# Patient Record
Sex: FEMALE | Race: WHITE | URBAN - METROPOLITAN AREA
[De-identification: names, ages, dates, MRNs, and addresses within clinical notes are randomized per-mention and may not be internally consistent; named-entity substitution may affect disease eponyms.]

---

## 2017-09-24 ENCOUNTER — INPATIENT (INPATIENT)
Facility: HOSPITAL | Age: 82
LOS: 2 days | Discharge: HOME HEALTH | End: 2017-09-27
Attending: INTERNAL MEDICINE | Admitting: INTERNAL MEDICINE
Payer: MEDICARE

## 2017-09-24 VITALS
HEART RATE: 73 BPM | WEIGHT: 89.95 LBS | DIASTOLIC BLOOD PRESSURE: 76 MMHG | RESPIRATION RATE: 18 BRPM | HEIGHT: 60 IN | SYSTOLIC BLOOD PRESSURE: 135 MMHG | TEMPERATURE: 99 F | OXYGEN SATURATION: 86 %

## 2017-09-24 DIAGNOSIS — R74.8 ABNORMAL LEVELS OF OTHER SERUM ENZYMES: ICD-10-CM

## 2017-09-24 DIAGNOSIS — F03.90 UNSPECIFIED DEMENTIA, UNSPECIFIED SEVERITY, WITHOUT BEHAVIORAL DISTURBANCE, PSYCHOTIC DISTURBANCE, MOOD DISTURBANCE, AND ANXIETY: ICD-10-CM

## 2017-09-24 DIAGNOSIS — J18.9 PNEUMONIA, UNSPECIFIED ORGANISM: ICD-10-CM

## 2017-09-24 DIAGNOSIS — E78.5 HYPERLIPIDEMIA, UNSPECIFIED: ICD-10-CM

## 2017-09-24 DIAGNOSIS — D64.9 ANEMIA, UNSPECIFIED: ICD-10-CM

## 2017-09-24 DIAGNOSIS — I25.10 ATHEROSCLEROTIC HEART DISEASE OF NATIVE CORONARY ARTERY WITHOUT ANGINA PECTORIS: ICD-10-CM

## 2017-09-24 LAB
ALBUMIN SERPL ELPH-MCNC: 2.5 G/DL — LOW (ref 3.3–5)
ALP SERPL-CCNC: 101 U/L — SIGNIFICANT CHANGE UP (ref 40–120)
ALT FLD-CCNC: 22 U/L — SIGNIFICANT CHANGE UP (ref 12–78)
ANION GAP SERPL CALC-SCNC: 4 MMOL/L — LOW (ref 5–17)
ANISOCYTOSIS BLD QL: SLIGHT — SIGNIFICANT CHANGE UP
APPEARANCE UR: (no result)
APTT BLD: 28.2 SEC — SIGNIFICANT CHANGE UP (ref 27.5–37.4)
AST SERPL-CCNC: 29 U/L — SIGNIFICANT CHANGE UP (ref 15–37)
BACTERIA # UR AUTO: (no result)
BASO STIPL BLD QL SMEAR: SLIGHT — SIGNIFICANT CHANGE UP
BASOPHILS # BLD AUTO: 0.1 K/UL — SIGNIFICANT CHANGE UP (ref 0–0.2)
BASOPHILS NFR BLD AUTO: 0.9 % — SIGNIFICANT CHANGE UP (ref 0–2)
BILIRUB SERPL-MCNC: 0.4 MG/DL — SIGNIFICANT CHANGE UP (ref 0.2–1.2)
BILIRUB UR-MCNC: NEGATIVE — SIGNIFICANT CHANGE UP
BUN SERPL-MCNC: 14 MG/DL — SIGNIFICANT CHANGE UP (ref 7–23)
CALCIUM SERPL-MCNC: 8.6 MG/DL — SIGNIFICANT CHANGE UP (ref 8.5–10.1)
CHLORIDE SERPL-SCNC: 110 MMOL/L — HIGH (ref 96–108)
CHOLEST SERPL-MCNC: 112 MG/DL — SIGNIFICANT CHANGE UP (ref 10–199)
CK SERPL-CCNC: 178 U/L — SIGNIFICANT CHANGE UP (ref 26–192)
CO2 SERPL-SCNC: 30 MMOL/L — SIGNIFICANT CHANGE UP (ref 22–31)
COLOR SPEC: YELLOW — SIGNIFICANT CHANGE UP
COMMENT - URINE: SIGNIFICANT CHANGE UP
CREAT SERPL-MCNC: 0.66 MG/DL — SIGNIFICANT CHANGE UP (ref 0.5–1.3)
DIFF PNL FLD: (no result)
EOSINOPHIL # BLD AUTO: 0.1 K/UL — SIGNIFICANT CHANGE UP (ref 0–0.5)
EOSINOPHIL NFR BLD AUTO: 0.9 % — SIGNIFICANT CHANGE UP (ref 0–6)
EPI CELLS # UR: NEGATIVE — SIGNIFICANT CHANGE UP
GLUCOSE SERPL-MCNC: 111 MG/DL — HIGH (ref 70–99)
GLUCOSE UR QL: NEGATIVE MG/DL — SIGNIFICANT CHANGE UP
HCT VFR BLD CALC: 38.3 % — SIGNIFICANT CHANGE UP (ref 34.5–45)
HDLC SERPL-MCNC: 68 MG/DL — SIGNIFICANT CHANGE UP (ref 40–125)
HGB BLD-MCNC: 12.4 G/DL — SIGNIFICANT CHANGE UP (ref 11.5–15.5)
HYPERCHROMIA BLD QL AUTO: SLIGHT — SIGNIFICANT CHANGE UP
INR BLD: 1.24 RATIO — HIGH (ref 0.88–1.16)
KETONES UR-MCNC: NEGATIVE — SIGNIFICANT CHANGE UP
LACTATE SERPL-SCNC: 1.2 MMOL/L — SIGNIFICANT CHANGE UP (ref 0.7–2)
LEUKOCYTE ESTERASE UR-ACNC: (no result)
LIPID PNL WITH DIRECT LDL SERPL: 35 MG/DL — SIGNIFICANT CHANGE UP
LYMPHOCYTES # BLD AUTO: 1.5 K/UL — SIGNIFICANT CHANGE UP (ref 1–3.3)
LYMPHOCYTES # BLD AUTO: 18.2 % — SIGNIFICANT CHANGE UP (ref 13–44)
MACROCYTES BLD QL: SIGNIFICANT CHANGE UP
MAGNESIUM SERPL-MCNC: 1.7 MG/DL — SIGNIFICANT CHANGE UP (ref 1.6–2.6)
MANUAL DIF COMMENT BLD-IMP: SIGNIFICANT CHANGE UP
MCHC RBC-ENTMCNC: 32.3 GM/DL — SIGNIFICANT CHANGE UP (ref 32–36)
MCHC RBC-ENTMCNC: 35.1 PG — HIGH (ref 27–34)
MCV RBC AUTO: 110.8 FL — HIGH (ref 80–100)
MONOCYTES # BLD AUTO: 0.4 K/UL — SIGNIFICANT CHANGE UP (ref 0–0.9)
MONOCYTES NFR BLD AUTO: 4.4 % — SIGNIFICANT CHANGE UP (ref 2–14)
NEUTROPHILS # BLD AUTO: 6.3 K/UL — SIGNIFICANT CHANGE UP (ref 1.8–7.4)
NEUTROPHILS NFR BLD AUTO: 75.5 % — SIGNIFICANT CHANGE UP (ref 43–77)
NITRITE UR-MCNC: POSITIVE
NT-PROBNP SERPL-SCNC: HIGH PG/ML (ref 0–450)
OVALOCYTES BLD QL SMEAR: SLIGHT — SIGNIFICANT CHANGE UP
PH UR: 5 — SIGNIFICANT CHANGE UP (ref 5–8)
PLAT MORPH BLD: NORMAL — SIGNIFICANT CHANGE UP
PLATELET # BLD AUTO: 130 K/UL — LOW (ref 150–400)
POIKILOCYTOSIS BLD QL AUTO: SLIGHT — SIGNIFICANT CHANGE UP
POLYCHROMASIA BLD QL SMEAR: SLIGHT — SIGNIFICANT CHANGE UP
POTASSIUM SERPL-MCNC: 4 MMOL/L — SIGNIFICANT CHANGE UP (ref 3.5–5.3)
POTASSIUM SERPL-SCNC: 4 MMOL/L — SIGNIFICANT CHANGE UP (ref 3.5–5.3)
PROT SERPL-MCNC: 6.5 GM/DL — SIGNIFICANT CHANGE UP (ref 6–8.3)
PROT UR-MCNC: 30 MG/DL
PROTHROM AB SERPL-ACNC: 13.4 SEC — HIGH (ref 9.8–12.7)
RAPID RVP RESULT: DETECTED
RBC # BLD: 3.46 M/UL — LOW (ref 3.8–5.2)
RBC # FLD: 13.7 % — SIGNIFICANT CHANGE UP (ref 10.3–14.5)
RBC BLD AUTO: (no result)
RBC CASTS # UR COMP ASSIST: SIGNIFICANT CHANGE UP /HPF (ref 0–4)
RV+EV RNA SPEC QL NAA+PROBE: DETECTED
SODIUM SERPL-SCNC: 144 MMOL/L — SIGNIFICANT CHANGE UP (ref 135–145)
SP GR SPEC: 1.02 — SIGNIFICANT CHANGE UP (ref 1.01–1.02)
TOTAL CHOLESTEROL/HDL RATIO MEASUREMENT: 1.6 RATIO — LOW (ref 3.3–7.1)
TRIGL SERPL-MCNC: 47 MG/DL — SIGNIFICANT CHANGE UP (ref 10–149)
TROPONIN I SERPL-MCNC: 0.07 NG/ML — HIGH (ref 0.01–0.04)
TROPONIN I SERPL-MCNC: 0.1 NG/ML — HIGH (ref 0.01–0.04)
TSH SERPL-MCNC: 2.69 UIU/ML — SIGNIFICANT CHANGE UP (ref 0.36–3.74)
UROBILINOGEN FLD QL: NEGATIVE MG/DL — SIGNIFICANT CHANGE UP
WBC # BLD: 8.5 K/UL — SIGNIFICANT CHANGE UP (ref 3.8–10.5)
WBC # FLD AUTO: 8.5 K/UL — SIGNIFICANT CHANGE UP (ref 3.8–10.5)
WBC UR QL: (no result)

## 2017-09-24 PROCEDURE — 99285 EMERGENCY DEPT VISIT HI MDM: CPT

## 2017-09-24 PROCEDURE — 71250 CT THORAX DX C-: CPT | Mod: 26

## 2017-09-24 PROCEDURE — 71010: CPT | Mod: 26

## 2017-09-24 PROCEDURE — 93010 ELECTROCARDIOGRAM REPORT: CPT

## 2017-09-24 RX ORDER — MEMANTINE HYDROCHLORIDE AND DONEPEZIL HYDROCHLORIDE 21; 10 MG/1; MG/1
1 CAPSULE ORAL
Qty: 0 | Refills: 0 | COMMUNITY

## 2017-09-24 RX ORDER — HEPARIN SODIUM 5000 [USP'U]/ML
5000 INJECTION INTRAVENOUS; SUBCUTANEOUS EVERY 12 HOURS
Qty: 0 | Refills: 0 | Status: DISCONTINUED | OUTPATIENT
Start: 2017-09-24 | End: 2017-09-27

## 2017-09-24 RX ORDER — ATORVASTATIN CALCIUM 80 MG/1
1 TABLET, FILM COATED ORAL
Qty: 0 | Refills: 0 | COMMUNITY

## 2017-09-24 RX ORDER — IPRATROPIUM/ALBUTEROL SULFATE 18-103MCG
3 AEROSOL WITH ADAPTER (GRAM) INHALATION ONCE
Qty: 0 | Refills: 0 | Status: COMPLETED | OUTPATIENT
Start: 2017-09-24 | End: 2017-09-24

## 2017-09-24 RX ORDER — MEMANTINE HYDROCHLORIDE 10 MG/1
10 TABLET ORAL DAILY
Qty: 0 | Refills: 0 | Status: DISCONTINUED | OUTPATIENT
Start: 2017-09-24 | End: 2017-09-27

## 2017-09-24 RX ORDER — ATORVASTATIN CALCIUM 80 MG/1
40 TABLET, FILM COATED ORAL AT BEDTIME
Qty: 0 | Refills: 0 | Status: DISCONTINUED | OUTPATIENT
Start: 2017-09-24 | End: 2017-09-27

## 2017-09-24 RX ORDER — ONDANSETRON 8 MG/1
4 TABLET, FILM COATED ORAL EVERY 4 HOURS
Qty: 0 | Refills: 0 | Status: DISCONTINUED | OUTPATIENT
Start: 2017-09-24 | End: 2017-09-27

## 2017-09-24 RX ORDER — POTASSIUM CHLORIDE 20 MEQ
1 PACKET (EA) ORAL
Qty: 0 | Refills: 0 | COMMUNITY

## 2017-09-24 RX ORDER — WHEAT DEXTRIN 3 G/4 G
1 POWDER IN PACKET (EA) ORAL
Qty: 0 | Refills: 0 | COMMUNITY

## 2017-09-24 RX ORDER — ASPIRIN/CALCIUM CARB/MAGNESIUM 324 MG
325 TABLET ORAL ONCE
Qty: 0 | Refills: 0 | Status: COMPLETED | OUTPATIENT
Start: 2017-09-24 | End: 2017-09-24

## 2017-09-24 RX ORDER — BENZOYL PEROXIDE MICRONIZED 5.8 %
1 TOWELETTE (EA) TOPICAL
Qty: 0 | Refills: 0 | COMMUNITY

## 2017-09-24 RX ORDER — ACETAMINOPHEN 500 MG
650 TABLET ORAL EVERY 6 HOURS
Qty: 0 | Refills: 0 | Status: DISCONTINUED | OUTPATIENT
Start: 2017-09-24 | End: 2017-09-27

## 2017-09-24 RX ORDER — CHOLECALCIFEROL (VITAMIN D3) 125 MCG
1 CAPSULE ORAL
Qty: 0 | Refills: 0 | COMMUNITY

## 2017-09-24 RX ORDER — MIRTAZAPINE 45 MG/1
0.5 TABLET, ORALLY DISINTEGRATING ORAL
Qty: 0 | Refills: 0 | COMMUNITY

## 2017-09-24 RX ORDER — ALBUTEROL 90 UG/1
2.5 AEROSOL, METERED ORAL EVERY 4 HOURS
Qty: 0 | Refills: 0 | Status: DISCONTINUED | OUTPATIENT
Start: 2017-09-24 | End: 2017-09-27

## 2017-09-24 RX ORDER — MIRTAZAPINE 45 MG/1
7.5 TABLET, ORALLY DISINTEGRATING ORAL AT BEDTIME
Qty: 0 | Refills: 0 | Status: DISCONTINUED | OUTPATIENT
Start: 2017-09-24 | End: 2017-09-27

## 2017-09-24 RX ORDER — ALBUTEROL 90 UG/1
1 AEROSOL, METERED ORAL EVERY 4 HOURS
Qty: 0 | Refills: 0 | Status: DISCONTINUED | OUTPATIENT
Start: 2017-09-24 | End: 2017-09-27

## 2017-09-24 RX ORDER — FUROSEMIDE 40 MG
1 TABLET ORAL
Qty: 0 | Refills: 0 | COMMUNITY

## 2017-09-24 RX ORDER — ASPIRIN/CALCIUM CARB/MAGNESIUM 324 MG
81 TABLET ORAL DAILY
Qty: 0 | Refills: 0 | Status: DISCONTINUED | OUTPATIENT
Start: 2017-09-24 | End: 2017-09-27

## 2017-09-24 RX ORDER — MIRTAZAPINE 45 MG/1
30 TABLET, ORALLY DISINTEGRATING ORAL AT BEDTIME
Qty: 0 | Refills: 0 | Status: DISCONTINUED | OUTPATIENT
Start: 2017-09-24 | End: 2017-09-27

## 2017-09-24 RX ORDER — DOCUSATE SODIUM 100 MG
100 CAPSULE ORAL
Qty: 0 | Refills: 0 | Status: DISCONTINUED | OUTPATIENT
Start: 2017-09-24 | End: 2017-09-27

## 2017-09-24 RX ORDER — CHOLECALCIFEROL (VITAMIN D3) 125 MCG
1000 CAPSULE ORAL DAILY
Qty: 0 | Refills: 0 | Status: DISCONTINUED | OUTPATIENT
Start: 2017-09-24 | End: 2017-09-27

## 2017-09-24 RX ORDER — ASPIRIN/CALCIUM CARB/MAGNESIUM 324 MG
1 TABLET ORAL
Qty: 0 | Refills: 0 | COMMUNITY

## 2017-09-24 RX ORDER — DONEPEZIL HYDROCHLORIDE 10 MG/1
10 TABLET, FILM COATED ORAL AT BEDTIME
Qty: 0 | Refills: 0 | Status: DISCONTINUED | OUTPATIENT
Start: 2017-09-24 | End: 2017-09-27

## 2017-09-24 RX ORDER — SODIUM CHLORIDE 9 MG/ML
1000 INJECTION INTRAMUSCULAR; INTRAVENOUS; SUBCUTANEOUS ONCE
Qty: 0 | Refills: 0 | Status: COMPLETED | OUTPATIENT
Start: 2017-09-24 | End: 2017-09-24

## 2017-09-24 RX ORDER — INFLUENZA VIRUS VACCINE 15; 15; 15; 15 UG/.5ML; UG/.5ML; UG/.5ML; UG/.5ML
0.5 SUSPENSION INTRAMUSCULAR ONCE
Qty: 0 | Refills: 0 | Status: COMPLETED | OUTPATIENT
Start: 2017-09-24 | End: 2017-09-24

## 2017-09-24 RX ORDER — LACTOBACILLUS ACIDOPHILUS 100MM CELL
1 CAPSULE ORAL
Qty: 0 | Refills: 0 | Status: DISCONTINUED | OUTPATIENT
Start: 2017-09-24 | End: 2017-09-27

## 2017-09-24 RX ORDER — MIRTAZAPINE 45 MG/1
1 TABLET, ORALLY DISINTEGRATING ORAL
Qty: 0 | Refills: 0 | COMMUNITY

## 2017-09-24 RX ADMIN — Medication 1000 UNIT(S): at 12:28

## 2017-09-24 RX ADMIN — MIRTAZAPINE 30 MILLIGRAM(S): 45 TABLET, ORALLY DISINTEGRATING ORAL at 22:16

## 2017-09-24 RX ADMIN — Medication 1 TABLET(S): at 16:13

## 2017-09-24 RX ADMIN — INFLUENZA VIRUS VACCINE 0.5 MILLILITER(S): 15; 15; 15; 15 SUSPENSION INTRAMUSCULAR at 12:28

## 2017-09-24 RX ADMIN — HEPARIN SODIUM 5000 UNIT(S): 5000 INJECTION INTRAVENOUS; SUBCUTANEOUS at 18:38

## 2017-09-24 RX ADMIN — Medication 325 MILLIGRAM(S): at 04:55

## 2017-09-24 RX ADMIN — SODIUM CHLORIDE 1000 MILLILITER(S): 9 INJECTION INTRAMUSCULAR; INTRAVENOUS; SUBCUTANEOUS at 02:54

## 2017-09-24 RX ADMIN — MIRTAZAPINE 7.5 MILLIGRAM(S): 45 TABLET, ORALLY DISINTEGRATING ORAL at 22:16

## 2017-09-24 RX ADMIN — ATORVASTATIN CALCIUM 40 MILLIGRAM(S): 80 TABLET, FILM COATED ORAL at 22:16

## 2017-09-24 RX ADMIN — Medication 1 TABLET(S): at 22:16

## 2017-09-24 RX ADMIN — MEMANTINE HYDROCHLORIDE 10 MILLIGRAM(S): 10 TABLET ORAL at 12:54

## 2017-09-24 RX ADMIN — DONEPEZIL HYDROCHLORIDE 10 MILLIGRAM(S): 10 TABLET, FILM COATED ORAL at 22:16

## 2017-09-24 RX ADMIN — Medication 1 TABLET(S): at 18:38

## 2017-09-24 RX ADMIN — HEPARIN SODIUM 5000 UNIT(S): 5000 INJECTION INTRAVENOUS; SUBCUTANEOUS at 06:23

## 2017-09-24 RX ADMIN — Medication 3 MILLILITER(S): at 02:54

## 2017-09-24 NOTE — CHART NOTE - NSCHARTNOTEFT_GEN_A_CORE
Hospitalist Update Note    Patient seen with family (daughter) at bedside. More awake, ate lunch, mild cough reported.     Vital Signs Last 24 Hrs  T(C): 36.8 (24 Sep 2017 10:37), Max: 37.4 (24 Sep 2017 01:17)  T(F): 98.2 (24 Sep 2017 10:37), Max: 99.3 (24 Sep 2017 01:17)  HR: 57 (24 Sep 2017 10:37) (57 - 76)  BP: 103/43 (24 Sep 2017 10:37) (103/43 - 140/89)  BP(mean): --  RR: 19 (24 Sep 2017 10:37) (18 - 19)  SpO2: 97% (24 Sep 2017 10:37) (86% - 98%)    Elderly frail female appears comfortable. Soft spoken.     A/P    # Pneumonia -  CAP + Enterovirus.   - family provides info that patient tested positive for Strep throat infection at Urgent care prior to admission thus likely the organism to cause PNA.   - s/p Levaquin 750mg --> switch to Amoxicillin 875mg BID.   - continue supportive care.     Case discussed w/ family and RN. Hospitalist Update Note    Patient seen with family (daughter) at bedside. More awake, ate lunch, mild cough reported.     Vital Signs Last 24 Hrs  T(C): 36.8 (24 Sep 2017 10:37), Max: 37.4 (24 Sep 2017 01:17)  T(F): 98.2 (24 Sep 2017 10:37), Max: 99.3 (24 Sep 2017 01:17)  HR: 57 (24 Sep 2017 10:37) (57 - 76)  BP: 103/43 (24 Sep 2017 10:37) (103/43 - 140/89)  BP(mean): --  RR: 19 (24 Sep 2017 10:37) (18 - 19)  SpO2: 97% (24 Sep 2017 10:37) (86% - 98%)    Elderly frail female appears comfortable. Soft spoken.     A/P    # Pneumonia -  CAP + Enterovirus.   - family provides info that patient tested positive for Strep throat infection at Urgent care prior to admission thus likely the organism to cause PNA.   - s/p Levaquin 750mg --> switch to Amoxicillin 875mg BID.   - add Probiotic.  - continue supportive care.     Case discussed w/ family and RN.

## 2017-09-24 NOTE — H&P ADULT - PROBLEM SELECTOR PLAN 4
Mood stable  Resume home meds remeron, namenda, and aricept Mood stable  Resume home meds remeron, namenda, and aricept  supportive care/fall precautions

## 2017-09-24 NOTE — H&P ADULT - HISTORY OF PRESENT ILLNESS
The patient is a 89 y/o Female with no prior significant PMHx.  She presents to the ED with reports of weakness. The pt's family provides that the pt was feeling weak since the past few days, and today was so weak she couldn't sit in her chair. The pt notes that she has been having sob and productive cough associated with sputum since the past 1 day.   Denies associated fevers, or chill.  No h/o Headache, dizziness, abd pain, N/V/D, cp, rash or urinary incontinence.    ED course:   troponin = .074, BNP = 16,330, chest xray with evidence for infiltrate  Pt was given levofloxacin 750mg, duoneb, and ASA 325mg The patient is a 89 y/o Female with a PMHx notable for dementia, anemia, and hyperlipidemia.   She presents to the ED with reports of weakness. The pt's family provides that the pt was feeling weak since the past few days, and today was so weak she couldn't sit in her chair. The pt notes that she has been having sob and productive cough associated with sputum since the past 1 day.   Denies associated fevers, or chill.  No h/o Headache, dizziness, abd pain, N/V/D, cp, rash or urinary incontinence.    ED course:   troponin = .074, BNP = 16,330, chest xray with evidence for infiltrate  Pt was given levofloxacin 750mg, duoneb, and ASA 325mg The patient is a 89 y/o Female with a PMHx notable for dementia, anemia, and hyperlipidemia.   She presents to the ED with reports of weakness. The pt's family provides that the pt was feeling weak since the past few days, and today was so weak she couldn't sit in her chair. The pt notes that she has been having sob and productive cough associated with sputum since the past 1 day.   Denies associated fevers, or chill.  No h/o Headache, dizziness, abd pain, N/V/D, cp, rash or urinary incontinence.    ED course:   troponin = .074, BNP = 16,330, CT chest 9/24 : small left pl effusion and either atelectasis or a left basilar PNA.   Pt was given levofloxacin 750mg, duoneb, and ASA 325mg The patient is a 89 y/o Female with a PMHx notable for dementia, anemia, and hyperlipidemia.   She presents to the ED with reports of weakness. The pt's family provides that the pt was feeling weak since the past few days, and today was so weak she couldn't sit in her chair. The pt notes that she has been having sob and productive cough associated with sputum since the past 1 day.   Denies associated fevers, or chill.  No h/o Headache, dizziness, abd pain, N/V/D, cp, rash or urinary incontinence.  Patient visited great grandchildren over the [employer] holiday whom were sick with URI's.      ED course:   troponin = .074, BNP = 16,330, CT chest 9/24 : small left pl effusion and either atelectasis or a left basilar PNA.   Pt was given levofloxacin 750mg, duoneb, and ASA 325mg

## 2017-09-24 NOTE — H&P ADULT - NSHPPHYSICALEXAM_GEN_ALL_CORE
Physical Exam:   GENERAL APPEARANCE:  NAD, hemodynamically stable  T(C): 37.4 (09-24-17 @ 01:17), Max: 37.4 (09-24-17 @ 01:17)  HR: 73 (09-24-17 @ 01:17) (73 - 73)  BP: 135/76 (09-24-17 @ 01:17) (135/76 - 135/76)  RR: 18 (09-24-17 @ 01:17) (18 - 18)  SpO2: 86% (09-24-17 @ 01:17) (86% - 86%)

## 2017-09-24 NOTE — CONSULT NOTE ADULT - SUBJECTIVE AND OBJECTIVE BOX
CHIEF COMPLAINT:  Patient is a 89y old  Female who presents with a chief complaint of weakness (24 Sep 2017 03:29)      HPI:  The patient is a 89 y/o Female with a PMHx notable for dementia, ASHD s/p IWMI many years ago, anemia, and hyperlipidemia.   She presents to the ED with reports of weakness. The pt's family provides that the pt was feeling weak since the past few days, and was so weak she couldn't sit in her chair. The pt notes that she has been having sob and productive cough associated with sputum since the past 1 day.   Denies associated fevers, or chill.  No h/o Headache, dizziness, abd pain, N/V/D, cp, rash or urinary incontinence.  Patient visited great grandchildren over the Buddhist holiday whom were sick with URI's.  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    ED course:   troponin = .074, BNP = 16,330, CT chest  : small left pl effusion and either atelectasis or a left basilar PNA.   Pt was given levofloxacin 750mg, duoneb, and ASA 325mg        PMHx:  PAST MEDICAL & SURGICAL HISTORY:  Hyperlipidemia, unspecified hyperlipidemia type  Anemia, unspecified type  Dementia without behavioral disturbance, unspecified dementia type  No significant past surgical history      FAMILY HISTORY:   FAMILY HISTORY:  No pertinent family history in first degree relatives      ALLERGIES:  Allergies  penicillins (Unknown)      REVIEW OF SYSTEMS:    EYES/ENT: No visual changes;  No vertigo or throat pain   NECK: No pain or stiffness  RESPIRATORY: No, hemoptysis;   CARDIOVASCULAR: No chest pain or palpitations  GASTROINTESTINAL: No abdominal or epigastric pain. No nausea, vomiting, or hematemesis; No diarrhea or constipation. No melena or hematochezia.  GENITOURINARY: No dysuria, frequency or hematuria  NEUROLOGICAL: No numbness  SKIN: No itching, burning, rashes, or lesions   All other review of systems is negative unless indicated above    Vital Signs Last 24 Hrs  T(C): 36.7 (24 Sep 2017 07:03), Max: 37.4 (24 Sep 2017 01:17)  T(F): 98 (24 Sep 2017 07:03), Max: 99.3 (24 Sep 2017 01:17)  HR: 70 (24 Sep 2017 07:03) (70 - 76)  BP: 140/89 (24 Sep 2017 07:03) (134/71 - 140/89)  BP(mean): --  RR: 18 (24 Sep 2017 07:03) (18 - 19)  SpO2: 98% (24 Sep 2017 07:03) (86% - 98%)        PHYSICAL EXAM:   Constitutional:  awake and alert but confused  HEENT: PERR, EOMI, Normal Hearing,   Neck: Soft and supple, No LAD, No JVD  Respiratory: Breath sounds with insp and exp rhonchi bilaterally, No rales  Cardiovascular: S1 and S2, regular rate and rhythm, soft TERRELL at base and LLSB as before, no gallops or rubs  Gastrointestinal: Bowel Sounds present, soft, nontender, nondistended, no guarding, no rebound  Extremities: No peripheral edema  Vascular: 2+ peripheral pulses  Neurological: no focal deficits  Musculoskeletal: 5/5 strength b/l upper and lower extremities  Skin: No rashes      MEDICATIONS  (STANDING):  heparin  Injectable 5000 Unit(s) SubCutaneous every 12 hours  mirtazapine 30 milliGRAM(s) Oral at bedtime  mirtazapine 7.5 milliGRAM(s) Oral at bedtime  atorvastatin 40 milliGRAM(s) Oral at bedtime  cholecalciferol 1000 Unit(s) Oral daily  levoFLOXacin IVPB 500 milliGRAM(s) IV Intermittent every 24 hours  aspirin  chewable 81 milliGRAM(s) Oral daily  memantine 10 milliGRAM(s) Oral daily  donepezil 10 milliGRAM(s) Oral at bedtime  ALBUTerol    90 MICROgram(s) HFA Inhaler 1 Puff(s) Inhalation every 4 hours      LABS: All Labs Reviewed:                        12.4   8.5   )-----------( 130      ( 24 Sep 2017 01:41 )             38.3     -    144  |  110<H>  |  14  ----------------------------<  111<H>  4.0   |  30  |  0.66    Ca    8.6      24 Sep 2017 01:41  Mg     1.7         TPro  6.5  /  Alb  2.5<L>  /  TBili  0.4  /  DBili  x   /  AST  29  /  ALT  22  /  AlkPhos  101  -    PT/INR - ( 24 Sep 2017 01:41 )   PT: 13.4 sec;   INR: 1.24 ratio         PTT - ( 24 Sep 2017 01:41 )  PTT:28.2 sec  CARDIAC MARKERS ( 24 Sep 2017 05:32 )  0.098 ng/mL / x     / x     / x     / x      CARDIAC MARKERS ( 24 Sep 2017 01:41 )  0.074 ng/mL / x     / 178 U/L / x     / x        Serum Pro-Brain Natriuretic Peptide: 26587 pg/mL ( @ 01:41)      BLOOD CULTURES:  Entero/Rhinovirus (RapRVP): Detected: Test Name:ev/rv  Called by:Karine  Called to:Alejandra RN  Read back 2 Pt IDs:y Read back values:y Date/Tm:17 06:49 (17 @ 03:59)      RADIOLOGY:  CXR: 17:  Small left pleural effusion and either atelectasis or a left basilar pneumonia.      EK17:    Sinus bradycardia, LAD, LVH, old inferior Q's as before    TELEMETRY:  17: Sinus bradycardia    ECHO:

## 2017-09-24 NOTE — H&P ADULT - PMH
No pertinent past medical history Anemia, unspecified type    Dementia without behavioral disturbance, unspecified dementia type    Hyperlipidemia, unspecified hyperlipidemia type

## 2017-09-24 NOTE — ED ADULT NURSE REASSESSMENT NOTE - NS ED NURSE REASSESS COMMENT FT1
Pt straightcath at this time. 200 ml residual. Clear yellow urine noted. urine sent to lab. Pt had bowel incontinence. Complete care rendered. Pt going to 3E. Verbal report given to YUDELKA Higuera. Pt to go up at 0730hrs. will continue to monitor.

## 2017-09-24 NOTE — ED ADULT NURSE NOTE - OBJECTIVE STATEMENT
Pt presented to ED for weakness. As per family at bedside, pt's been coughing since night of 9/22/17 along with increased weakness. upon arrival to ED, pt exhibiting nonproductive cough with +Rhonchi to left lower lobes. Pt c/o lethargy at this time. Afebrile. Denies any other symptoms at this time.

## 2017-09-24 NOTE — H&P ADULT - ASSESSMENT
The patient is a 89 y/o Female with no prior significant PMHx.  She presents to the ED with reports of weakness. The pt's family provides that the pt was feeling weak since the past few days, and today was so weak she couldn't sit in her chair. The pt notes that she has been having sob and productive cough associated with sputum since the past 1 day.   Denies associated fevers, or chill.  No h/o Headache, dizziness, abd pain, N/V/D, cp, rash or urinary incontinence.    ED course:   troponin = .074, BNP = 16,330, chest xray with evidence for infiltrate  Pt was given levofloxacin 750mg, duoneb, and ASA 325mg 89 year old female with a PMHx dementia, HLD, and anemia.   She presents to the ED with reports of weakness. The pt's family provides that the pt was feeling weak since the past few days, and today was so weak she couldn't sit in her chair. The pt notes that she has been having sob and productive cough associated with sputum since the past 1 day.   Denies associated fevers, or chill.  No h/o Headache, dizziness, abd pain, N/V/D, cp, rash or urinary incontinence.    ED course:   troponin = .074, BNP = 16,330, chest xray with evidence for infiltrate  Pt was given levofloxacin 750mg, duoneb, and ASA 325mg 89 year old female with a PMHx dementia, HLD, and anemia.   She presents to the ED with reports of weakness. The pt's family provides that the pt was feeling weak since the past few days, and today was so weak she couldn't sit in her chair. The pt notes that she has been having sob and productive cough associated with sputum since the past 1 day.   Denies associated fevers, or chill.  No h/o Headache, dizziness, abd pain, N/V/D, cp, rash or urinary incontinence.    ED course:   troponin = .074, BNP = 16,330,  CT chest 9/24 : small left pl effusion and either atelectasis or a left basilar PNA.   Pt was given levofloxacin 750mg, duoneb, and ASA 325mg 89 year old female with a PMHx dementia, HLD, and anemia.   She presents to the ED with weakness and cough x 1 day.     ED course:   troponin = .074, BNP = 16,330,  CT chest 9/24 : small left pl effusion and either atelectasis or a left basilar PNA.   Pt was given levofloxacin 750mg, duoneb, and ASA 325mg

## 2017-09-24 NOTE — H&P ADULT - PROBLEM SELECTOR PLAN 2
Likely demand mediated in the setting of PNA  Trend troponin to peak  continue with daily ASA  Send for 2D ECHO, TSH, fasting lipid panel  consult cardiology Likely demand mediated in the setting of PNA  Trend troponin to peak  continue with daily ASA 81mg  Send for 2D ECHO, TSH, fasting lipid panel  consult cardiology

## 2017-09-24 NOTE — H&P ADULT - NSHPLABSRESULTS_GEN_ALL_CORE
12.4   8.5   )-----------( 130      ( 24 Sep 2017 01:41 )             38.3     09-24    144  |  110<H>  |  14  ----------------------------<  111<H>  4.0   |  30  |  0.66    Ca    8.6      24 Sep 2017 01:41  Mg     1.7     09-24    TPro  6.5  /  Alb  2.5<L>  /  TBili  0.4  /  DBili  x   /  AST  29  /  ALT  22  /  AlkPhos  101  09-24    CARDIAC MARKERS ( 24 Sep 2017 01:41 )  0.074 ng/mL / x     / 178 U/L / x     / x              PT/INR - ( 24 Sep 2017 01:41 )   PT: 13.4 sec;   INR: 1.24 ratio         PTT - ( 24 Sep 2017 01:41 )  PTT:28.2 sec

## 2017-09-24 NOTE — ED ADULT NURSE NOTE - CHPI ED SYMPTOMS NEG
no nausea/no numbness/no decreased eating/drinking/no tingling/no chills/no dizziness/no pain/no fever/no vomiting

## 2017-09-24 NOTE — H&P ADULT - PROBLEM SELECTOR PLAN 1
Admit to medicine  Blood cultures x 2 already collected in ED  Send urine legionella antigen, sputum GS and culture   continue treatment with levaquin  follow clinically Admit to medicine  Results of CT chest 9/24 noted : small left pl effusion and either atelectasis or a left basilar PNA.   Blood cultures x 2 already collected in ED  Send urine legionella antigen, sputum GS and culture   continue treatment with levaquin  follow clinically

## 2017-09-24 NOTE — CONSULT NOTE ADULT - ASSESSMENT
9/24/17:  Pt with above history and old IWMI with increased resp distress and lethargy admitted with viral pneumonia and some degree of acute on chronic diastolic CHF.  Elevated troponin levels due to demand ischemia and no indication for invasive cardiac testing at this time.  Will repeat an echo and continue respiratory support.  Pt has a DNI/DNR previously and family request this again.  Continue preadmission meds with Lasix as BP tolerates.  Continue as outlined by medicine etal.  Will follow.

## 2017-09-24 NOTE — ED PROVIDER NOTE - OBJECTIVE STATEMENT
89 y/o F presents to the ED s/p weakness. The pt's family provides that the pt was feeling weak since the past few days, and today was so weak she couldn't sit in her chair. The pt notes that she has been having sob and productive cough associated with sputum since the past 1 day. No h/o Headache, fever, chills, dizziness, abd pain, nvd, cp, rash or urinary incontinence.
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
## 2017-09-25 LAB
CULTURE RESULTS: SIGNIFICANT CHANGE UP
LEGIONELLA AG UR QL: NEGATIVE — SIGNIFICANT CHANGE UP
SPECIMEN SOURCE: SIGNIFICANT CHANGE UP

## 2017-09-25 PROCEDURE — 93308 TTE F-UP OR LMTD: CPT | Mod: 26

## 2017-09-25 RX ADMIN — HEPARIN SODIUM 5000 UNIT(S): 5000 INJECTION INTRAVENOUS; SUBCUTANEOUS at 05:55

## 2017-09-25 RX ADMIN — Medication 81 MILLIGRAM(S): at 11:44

## 2017-09-25 RX ADMIN — Medication 1 TABLET(S): at 17:47

## 2017-09-25 RX ADMIN — MIRTAZAPINE 7.5 MILLIGRAM(S): 45 TABLET, ORALLY DISINTEGRATING ORAL at 20:50

## 2017-09-25 RX ADMIN — DONEPEZIL HYDROCHLORIDE 10 MILLIGRAM(S): 10 TABLET, FILM COATED ORAL at 20:51

## 2017-09-25 RX ADMIN — Medication 1 TABLET(S): at 05:55

## 2017-09-25 RX ADMIN — MIRTAZAPINE 30 MILLIGRAM(S): 45 TABLET, ORALLY DISINTEGRATING ORAL at 20:50

## 2017-09-25 RX ADMIN — Medication 1 TABLET(S): at 11:44

## 2017-09-25 RX ADMIN — HEPARIN SODIUM 5000 UNIT(S): 5000 INJECTION INTRAVENOUS; SUBCUTANEOUS at 17:47

## 2017-09-25 RX ADMIN — MEMANTINE HYDROCHLORIDE 10 MILLIGRAM(S): 10 TABLET ORAL at 11:44

## 2017-09-25 RX ADMIN — ATORVASTATIN CALCIUM 40 MILLIGRAM(S): 80 TABLET, FILM COATED ORAL at 20:51

## 2017-09-25 RX ADMIN — Medication 1000 UNIT(S): at 11:44

## 2017-09-25 RX ADMIN — Medication 650 MILLIGRAM(S): at 20:50

## 2017-09-25 RX ADMIN — Medication 1 TABLET(S): at 17:49

## 2017-09-25 NOTE — PHYSICAL THERAPY INITIAL EVALUATION ADULT - PERTINENT HX OF CURRENT PROBLEM, REHAB EVAL
Pt admitted to  secondary to weakness, SOB, and cough. CT chest: small left pleural effusion and either atelectasis or left basilar PN. Pt admitted to  secondary to weakness, SOB, and cough. CT chest: small left pleural effusion and either atelectasis or left basilar PN. Troponins: 9/24- 0.074, 0.098.

## 2017-09-25 NOTE — PROGRESS NOTE ADULT - PROBLEM SELECTOR PLAN 4
Mood stable - periods of confusion noted.   Resumed home meds remeron, namenda, and aricept  supportive care/fall precautions

## 2017-09-25 NOTE — PHYSICAL THERAPY INITIAL EVALUATION ADULT - ACTIVE RANGE OF MOTION EXAMINATION, REHAB EVAL
Bilateral Shoulder flexion ~ 90 degrees. Bilateral Hip flexion ~ 90 degrees, bilateral DF neutral./deficits as listed below/no Active ROM deficits were identified

## 2017-09-25 NOTE — PROGRESS NOTE ADULT - SUBJECTIVE AND OBJECTIVE BOX
CC: fever, lethargy  History of Present Illness: 	  The patient is a 89 y/o Female with a PMHx notable for dementia, anemia, and hyperlipidemia presented to the ED w/ weakness found to have viral URI Enterovirus and suspected bacterial pneumonia given being found + strep throat at Urgent Care. Sick contacts include great grandchildren over the Jewish holiday.      ED course:   troponin = .074, BNP = 16,330, CT chest 9/24 : small left pl effusion and either atelectasis or a left basilar PNA.   Pt was given levofloxacin 750mg, duoneb, and ASA 325mg    9/25/17: Seen this AM, sleepy, denies pain. Satting well on NC but feels weak. + cough.   ROS: neg unless stated above.       Vital Signs Last 24 Hrs  T(C): 36.3 (25 Sep 2017 10:49), Max: 36.7 (25 Sep 2017 05:57)  T(F): 97.3 (25 Sep 2017 10:49), Max: 98 (25 Sep 2017 05:57)  HR: 86 (25 Sep 2017 10:49) (56 - 86)  BP: 131/57 (25 Sep 2017 10:49) (103/50 - 137/57)  BP(mean): --  RR: 16 (25 Sep 2017 10:49) (16 - 16)  SpO2: 96% (25 Sep 2017 10:49) (95% - 96%)    PHYSICAL EXAM:  Constitutional: NAD, sleepy but arousable frail elderly female. Appears comfortable.   HEENT: PERR, EOMI, Normal Hearing, MMM  Neck: Soft and supple, No LAD, No JVD  Respiratory: bilateral mild rhonchi at bases.   Cardiovascular: S1 and S2, regular rate and rhythm, blurring holosystolic Murmur, gallops or rubs  Gastrointestinal: Bowel Sounds present, soft, nontender, nondistended, no guarding, no rebound  Extremities: No peripheral edema  Vascular: 2+ peripheral pulses  Neurological: A/O x 3, no focal deficits  Musculoskeletal: 5/5 strength b/l upper and lower extremities  Skin: No rashes    MEDICATIONS  (STANDING):  heparin  Injectable 5000 Unit(s) SubCutaneous every 12 hours  mirtazapine 30 milliGRAM(s) Oral at bedtime  mirtazapine 7.5 milliGRAM(s) Oral at bedtime  atorvastatin 40 milliGRAM(s) Oral at bedtime  cholecalciferol 1000 Unit(s) Oral daily  aspirin  chewable 81 milliGRAM(s) Oral daily  memantine 10 milliGRAM(s) Oral daily  donepezil 10 milliGRAM(s) Oral at bedtime  ALBUTerol    90 MICROgram(s) HFA Inhaler 1 Puff(s) Inhalation every 4 hours  amoxicillin  875 milliGRAM(s)/clavulanate 1 Tablet(s) Oral two times a day  lactobacillus acidophilus 1 Tablet(s) Oral two times a day with meals    LABS: All Labs Reviewed:                                    12.4   8.5   )-----------( 130      ( 24 Sep 2017 01:41 )             38.3     09-24    144  |  110<H>  |  14  ----------------------------<  111<H>  4.0   |  30  |  0.66    Ca    8.6      24 Sep 2017 01:41  Mg     1.7     09-24    TPro  6.5  /  Alb  2.5<L>  /  TBili  0.4  /  DBili  x   /  AST  29  /  ALT  22  /  AlkPhos  101  09-24    PT/INR - ( 24 Sep 2017 01:41 )   PT: 13.4 sec;   INR: 1.24 ratio         PTT - ( 24 Sep 2017 01:41 )  PTT:28.2 sec  CARDIAC MARKERS ( 24 Sep 2017 05:32 )  0.098 ng/mL / x     / x     / x     / x      CARDIAC MARKERS ( 24 Sep 2017 01:41 )  0.074 ng/mL / x     / 178 U/L / x     / x          Culture - Blood (09.24.17 @ 02:25)    Specimen Source: .Blood None    Culture Results:   No growth to date.      Culture - Urine (09.24.17 @ 06:35)    Specimen Source: .Urine Clean Catch (Midstream)    Culture Results:   <10,000 CFU/ml Normal Urogenital madi present    PENDING ECHO  Thyroid Stimulating Hormone, Serum: 2.690 uIU/mL (09.24.17 @ 05:32)

## 2017-09-25 NOTE — PHYSICAL THERAPY INITIAL EVALUATION ADULT - GENERAL OBSERVATIONS, REHAB EVAL
Pt found supine in bed with O2 NC, tele monitor, and bed alarm activated. Pt also found with increase thoracic kyphosis in supine, and with difficulty keeping head upright. Pt with no c/o pain.

## 2017-09-25 NOTE — PROGRESS NOTE ADULT - NSHPATTENDINGPLANDISCUSS_GEN_ALL_CORE
patient and staff. Tenative dc home in 1-2 days pending clinical improvement. Has private aide at home.

## 2017-09-25 NOTE — PROGRESS NOTE ADULT - PROBLEM SELECTOR PLAN 2
Likely demand mediated in the setting of PNA  continue with daily ASA 81mg  Send for 2D ECHO, TSH = normal limits.  - appreciate cardio following.

## 2017-09-25 NOTE — PHYSICAL THERAPY INITIAL EVALUATION ADULT - GAIT PATTERN USED, PT EVAL
Pt required verbal cues to increase posture, and to keep head upright. Also to increase step length bilaterally,, and to keep appropriate navigation of RW. Pt required verbal cues to increase posture, and to keep head upright. Also to increase step length bilaterally, and to keep appropriate navigation of RW.

## 2017-09-25 NOTE — PROGRESS NOTE ADULT - SUBJECTIVE AND OBJECTIVE BOX
CHIEF COMPLAINT:  Patient is a 89y old  Female who presents with a chief complaint of weakness (24 Sep 2017 03:29)      HPI:  The patient is a 89 y/o Female with a PMHx notable for dementia, ASHD s/p IWMI many years ago, anemia, and hyperlipidemia.   She presents to the ED with reports of weakness. The pt's family provides that the pt was feeling weak since the past few days, and was so weak she couldn't sit in her chair. The pt notes that she has been having sob and productive cough associated with sputum since the past 1 day.   Denies associated fevers, or chill.  No h/o Headache, dizziness, abd pain, N/V/D, cp, rash or urinary incontinence.  Patient visited great grandchildren over the [employer] holiday whom were sick with URI's.  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    ED course:   troponin = .074, BNP = 16,330, CT chest  : small left pl effusion and either atelectasis or a left basilar PNA.   Pt was given levofloxacin 750mg, duoneb, and ASA 325mg      17:  Resting comfortably this AM.  Still confused as before.  No new cardiac symptoms.  UTI tay treated.  CXR without active infiltrates.        PMHx:  PAST MEDICAL & SURGICAL HISTORY:  Hyperlipidemia, unspecified hyperlipidemia type  Anemia, unspecified type  Dementia without behavioral disturbance, unspecified dementia type  No significant past surgical history      FAMILY HISTORY:   FAMILY HISTORY:  No pertinent family history in first degree relatives      ALLERGIES:  Allergies  penicillins (Unknown)      REVIEW OF SYSTEMS:    EYES/ENT: No visual changes;  No vertigo or throat pain   NECK: No pain or stiffness  RESPIRATORY: No, hemoptysis;   CARDIOVASCULAR: No chest pain or palpitations  GASTROINTESTINAL: No abdominal or epigastric pain. No nausea, vomiting, or hematemesis; No diarrhea or constipation. No melena or hematochezia.  GENITOURINARY: No dysuria, frequency or hematuria  NEUROLOGICAL: No numbness  SKIN: No itching, burning, rashes, or lesions   All other review of systems is negative unless indicated above    Vital Signs Last 24 Hrs  T(C): 36.7 (25 Sep 2017 05:57), Max: 36.8 (24 Sep 2017 10:37)  T(F): 98 (25 Sep 2017 05:57), Max: 98.2 (24 Sep 2017 10:37)  HR: 74 (25 Sep 2017 05:57) (56 - 74)  BP: 137/57 (25 Sep 2017 05:57) (103/43 - 140/89)  BP(mean): --  RR: 19 (24 Sep 2017 10:37) (18 - 19)  SpO2: 95% (24 Sep 2017 17:06) (95% - 98%)      PHYSICAL EXAM:   Constitutional:  awake and alert but confused  HEENT: PERR, EOMI, Normal Hearing,   Neck: Soft and supple, No LAD, No JVD  Respiratory: Breath sounds with less insp and exp rhonchi bilaterally, No rales  Cardiovascular: S1 and S2, regular rate and rhythm, soft TERRELL at base and LLSB as before, no gallops or rubs  Gastrointestinal: Bowel Sounds present, soft, nontender, nondistended, no guarding, no rebound  Extremities: No peripheral edema  Vascular: 2+ peripheral pulses  Neurological: no focal deficits        MEDICATIONS  (STANDING):  heparin  Injectable 5000 Unit(s) SubCutaneous every 12 hours  mirtazapine 30 milliGRAM(s) Oral at bedtime  mirtazapine 7.5 milliGRAM(s) Oral at bedtime  atorvastatin 40 milliGRAM(s) Oral at bedtime  cholecalciferol 1000 Unit(s) Oral daily  aspirin  chewable 81 milliGRAM(s) Oral daily  memantine 10 milliGRAM(s) Oral daily  donepezil 10 milliGRAM(s) Oral at bedtime  ALBUTerol    90 MICROgram(s) HFA Inhaler 1 Puff(s) Inhalation every 4 hours  amoxicillin  875 milliGRAM(s)/clavulanate 1 Tablet(s) Oral two times a day  lactobacillus acidophilus 1 Tablet(s) Oral two times a day with meals      LABS: All Labs Reviewed:                        12.4   8.5   )-----------( 130      ( 24 Sep 2017 01:41 )             38.3     09-24    144  |  110<H>  |  14  ----------------------------<  111<H>  4.0   |  30  |  0.66    Ca    8.6      24 Sep 2017 01:41  Mg     1.7     09-24    TPro  6.5  /  Alb  2.5<L>  /  TBili  0.4  /  DBili  x   /  AST  29  /  ALT  22  /  AlkPhos  101      PT/INR - ( 24 Sep 2017 01:41 )   PT: 13.4 sec;   INR: 1.24 ratio         PTT - ( 24 Sep 2017 01:41 )  PTT:28.2 sec  CARDIAC MARKERS ( 24 Sep 2017 05:32 )  0.098 ng/mL / x     / x     / x     / x      CARDIAC MARKERS ( 24 Sep 2017 01:41 )  0.074 ng/mL / x     / 178 U/L / x     / x          Serum Pro-Brain Natriuretic Peptide: 86399 pg/mL ( @ 01:41)    BLOOD CULTURES:   LIPID PROFILE lipid profile                           @ 05:32  cholesterol           112 mg/dL  Direct LDL            35 mg/dL  HDL cholesterol       68 mg/dL  HDL/Total cholesterol 1.6 RATIO  Triglycerides, serum  47 mg/dL      RADIOLOGY:    CXR:  17  No active pulmonary disease.    CHEST CT: 17:  Small left pleural effusion and either atelectasis or a left basilar pneumonia.      EK17:    Sinus bradycardia, LAD, LVH, old inferior Q's as before    TELEMETRY:  17: Sinus bradycardia    ECHO:

## 2017-09-25 NOTE — PROGRESS NOTE ADULT - PROBLEM SELECTOR PLAN 1
- likely 2/2 strep pneumo given + strep throat test at Urgent Care [facility].   - complicated by Enterovirus.   - s/p Levaquin in ED --> switched to Amoxicillin day #2.   - neg blood ctx, neg u. ctx so patient DOES NOT have a UTI.   Results of CT chest 9/24 noted : small left pl effusion and either atelectasis or a left basilar PNA.   - supplemental O2. PT for weakness.

## 2017-09-25 NOTE — PROGRESS NOTE ADULT - ASSESSMENT
89 year old female with a PMHx dementia, HLD, and anemia acute for acute Enteroviral infection with superimposed strep pneumonie infection and weakness:

## 2017-09-25 NOTE — PROGRESS NOTE ADULT - ASSESSMENT
9/24/17:  Pt with above history and old IWMI with increased resp distress and lethargy admitted with viral pneumonia and some degree of acute on chronic diastolic CHF.  Elevated troponin levels due to demand ischemia and no indication for invasive cardiac testing at this time.  Will repeat an echo and continue respiratory support.  Pt has a DNI/DNR previously and family request this again.  Continue preadmission meds with Lasix as BP tolerates.  Continue as outlined by medicine etal.  Will follow.    9/25/17:  URI and ? pneumonia being treated.  Clinically stable from a cardiac standpoint so far.  DNR/DRI in place.  Continue supportive care as per medicine etal.  No indication for further cardiac testing at this time.  Will follow.

## 2017-09-26 RX ADMIN — Medication 1 TABLET(S): at 10:16

## 2017-09-26 RX ADMIN — Medication 1 TABLET(S): at 17:05

## 2017-09-26 RX ADMIN — Medication 81 MILLIGRAM(S): at 12:15

## 2017-09-26 RX ADMIN — MIRTAZAPINE 7.5 MILLIGRAM(S): 45 TABLET, ORALLY DISINTEGRATING ORAL at 22:20

## 2017-09-26 RX ADMIN — ATORVASTATIN CALCIUM 40 MILLIGRAM(S): 80 TABLET, FILM COATED ORAL at 22:20

## 2017-09-26 RX ADMIN — ALBUTEROL 2.5 MILLIGRAM(S): 90 AEROSOL, METERED ORAL at 22:36

## 2017-09-26 RX ADMIN — Medication 1000 UNIT(S): at 12:16

## 2017-09-26 RX ADMIN — Medication 1 TABLET(S): at 17:03

## 2017-09-26 RX ADMIN — HEPARIN SODIUM 5000 UNIT(S): 5000 INJECTION INTRAVENOUS; SUBCUTANEOUS at 17:04

## 2017-09-26 RX ADMIN — DONEPEZIL HYDROCHLORIDE 10 MILLIGRAM(S): 10 TABLET, FILM COATED ORAL at 22:20

## 2017-09-26 RX ADMIN — HEPARIN SODIUM 5000 UNIT(S): 5000 INJECTION INTRAVENOUS; SUBCUTANEOUS at 05:58

## 2017-09-26 RX ADMIN — MEMANTINE HYDROCHLORIDE 10 MILLIGRAM(S): 10 TABLET ORAL at 12:16

## 2017-09-26 RX ADMIN — MIRTAZAPINE 30 MILLIGRAM(S): 45 TABLET, ORALLY DISINTEGRATING ORAL at 22:50

## 2017-09-26 RX ADMIN — Medication 1 TABLET(S): at 05:58

## 2017-09-26 NOTE — PROGRESS NOTE ADULT - SUBJECTIVE AND OBJECTIVE BOX
CHIEF COMPLAINT:  Patient is a 89y old  Female who presents with a chief complaint of weakness (24 Sep 2017 03:29)      HPI:  The patient is a 89 y/o Female with a PMHx notable for dementia, ASHD s/p IWMI many years ago, anemia, and hyperlipidemia.   She presents to the ED with reports of weakness. The pt's family provides that the pt was feeling weak since the past few days, and was so weak she couldn't sit in her chair. The pt notes that she has been having sob and productive cough associated with sputum since the past 1 day.   Denies associated fevers, or chill.  No h/o Headache, dizziness, abd pain, N/V/D, cp, rash or urinary incontinence.  Patient visited great grandchildren over the [employer] holiday whom were sick with URI's.  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    ED course:   troponin = .074, BNP = 16,330, CT chest  : small left pl effusion and either atelectasis or a left basilar PNA.   Pt was given levofloxacin 750mg, duoneb, and ASA 325mg      17:  Resting comfortably this AM.  Still confused as before.  No new cardiac symptoms.  UTI tya treated.  CXR without active infiltrates.    17:  Patient remains on 3E.        PMHx:  PAST MEDICAL & SURGICAL HISTORY:  Hyperlipidemia, unspecified hyperlipidemia type  Anemia, unspecified type  Dementia without behavioral disturbance, unspecified dementia type  No significant past surgical history      FAMILY HISTORY:   FAMILY HISTORY:  No pertinent family history in first degree relatives      ALLERGIES:  Allergies  penicillins (Unknown)      REVIEW OF SYSTEMS:    EYES/ENT: No visual changes;  No vertigo or throat pain   NECK: No pain or stiffness  RESPIRATORY: No, hemoptysis;   CARDIOVASCULAR: No chest pain or palpitations  GASTROINTESTINAL: No abdominal or epigastric pain. No nausea, vomiting, or hematemesis; No diarrhea or constipation. No melena or hematochezia.  GENITOURINARY: No dysuria, frequency or hematuria  NEUROLOGICAL: No numbness  SKIN: No itching, burning, rashes, or lesions   All other review of systems is negative unless indicated above      Vital Signs Last 24 Hrs  T(C): 36.8 (26 Sep 2017 05:17), Max: 36.9 (25 Sep 2017 16:35)  T(F): 98.2 (26 Sep 2017 05:17), Max: 98.4 (25 Sep 2017 16:35)  HR: 64 (26 Sep 2017 05:17) (64 - 86)  BP: 115/52 (26 Sep 2017 05:17) (114/61 - 131/57)  BP(mean): --  RR: 16 (25 Sep 2017 10:49) (16 - 16)  SpO2: 98% (26 Sep 2017 05:17) (96% - 100%)      PHYSICAL EXAM:   Constitutional:  awake and alert but confused  HEENT: PERR, EOMI, Normal Hearing,   Neck: Soft and supple, No LAD, No JVD  Respiratory: Breath sounds with less insp and exp rhonchi bilaterally, No rales  Cardiovascular: S1 and S2, regular rate and rhythm, soft TERRELL at base and LLSB as before, no gallops or rubs  Gastrointestinal: Bowel Sounds present, soft, nontender, nondistended, no guarding, no rebound  Extremities: No peripheral edema  Vascular: 2+ peripheral pulses  Neurological: no focal deficits        MEDICATIONS  (STANDING):  heparin  Injectable 5000 Unit(s) SubCutaneous every 12 hours  mirtazapine 30 milliGRAM(s) Oral at bedtime  mirtazapine 7.5 milliGRAM(s) Oral at bedtime  atorvastatin 40 milliGRAM(s) Oral at bedtime  cholecalciferol 1000 Unit(s) Oral daily  aspirin  chewable 81 milliGRAM(s) Oral daily  memantine 10 milliGRAM(s) Oral daily  donepezil 10 milliGRAM(s) Oral at bedtime  ALBUTerol    90 MICROgram(s) HFA Inhaler 1 Puff(s) Inhalation every 4 hours  amoxicillin  875 milliGRAM(s)/clavulanate 1 Tablet(s) Oral two times a day  lactobacillus acidophilus 1 Tablet(s) Oral two times a day with meals        LABS: All Labs Reviewed:                        12.4   8.5   )-----------( 130      ( 24 Sep 2017 01:41 )             38.3             144  |  110<H>  |  14  ----------------------------<  111<H>  4.0   |  30  |  0.66    Ca    8.6      24 Sep 2017 01:41  Mg     1.7         Culture - Blood (17 @ 02:25)    Specimen Source: .Blood None    Culture Results:   No growth to date.    Culture Results:   <10,000 CFU/ml Normal Urogenital madi present (17 @ 06:35)        TPro  6.5  /  Alb  2.5<L>  /  TBili  0.4  /  DBili  x   /  AST  29  /  ALT  22  /  AlkPhos  101      PT/INR - ( 24 Sep 2017 01:41 )   PT: 13.4 sec;   INR: 1.24 ratio         PTT - ( 24 Sep 2017 01:41 )  PTT:28.2 sec  CARDIAC MARKERS ( 24 Sep 2017 05:32 )  0.098 ng/mL / x     / x     / x     / x      CARDIAC MARKERS ( 24 Sep 2017 01:41 )  0.074 ng/mL / x     / 178 U/L / x     / x          Serum Pro-Brain Natriuretic Peptide: 16476 pg/mL ( @ 01:41)    BLOOD CULTURES:   LIPID PROFILE lipid profile                           @ 05:32  cholesterol           112 mg/dL  Direct LDL            35 mg/dL  HDL cholesterol       68 mg/dL  HDL/Total cholesterol 1.6 RATIO  Triglycerides, serum  47 mg/dL      RADIOLOGY:    CXR:  17  No active pulmonary disease.    CHEST CT: 17:  Small left pleural effusion and either atelectasis or a left basilar pneumonia.      EK17:    Sinus bradycardia, LAD, LVH, old inferior Q's as before    TELEMETRY:  17: Sinus    ECHO:

## 2017-09-26 NOTE — PROGRESS NOTE ADULT - SUBJECTIVE AND OBJECTIVE BOX
CHIEF COMPLAINT:  fever, lethargy    SUBJECTIVE: Still c/o productive cough, feels slightly better today, more energy. No sob and no difficulty expectorating.  No CP, lightheadedness, dizziness.    REVIEW OF SYSTEMS:  negative unless indicated above    Vital Signs Last 24 Hrs  T(C): 36.4 (26 Sep 2017 11:05), Max: 36.9 (25 Sep 2017 16:35)  T(F): 97.5 (26 Sep 2017 11:05), Max: 98.4 (25 Sep 2017 16:35)  HR: 64 (26 Sep 2017 11:05) (64 - 67)  BP: 99/71 (26 Sep 2017 11:05) (99/71 - 115/52)  BP(mean): --  RR: 17 (26 Sep 2017 11:05) (17 - 17)  SpO2: 98% (26 Sep 2017 11:05) (98% - 100%)    PHYSICAL EXAM:  Constitutional: NAD, awake, alert, frail elderly female.   HEENT: PERR, EOMI, Normal Hearing, MMM  Neck: Soft and supple, No LAD, No JVD  Respiratory: bilateral mild rhonchi at bases.   Cardiovascular: S1 and S2, regular rate and rhythm, blowing holosystolic Murmur, gallops or rubs  Gastrointestinal: Bowel Sounds present, soft, nontender, nondistended, no guarding, no rebound  Extremities: No peripheral edema  Vascular: 2+ peripheral pulses  Neurological: A/O x 3, no focal deficits  Musculoskeletal: 5/5 strength b/l upper and lower extremities  Skin: No rashes    MEDICATIONS:  MEDICATIONS  (STANDING):  heparin  Injectable 5000 Unit(s) SubCutaneous every 12 hours  mirtazapine 30 milliGRAM(s) Oral at bedtime  mirtazapine 7.5 milliGRAM(s) Oral at bedtime  atorvastatin 40 milliGRAM(s) Oral at bedtime  cholecalciferol 1000 Unit(s) Oral daily  aspirin  chewable 81 milliGRAM(s) Oral daily  memantine 10 milliGRAM(s) Oral daily  donepezil 10 milliGRAM(s) Oral at bedtime  ALBUTerol    90 MICROgram(s) HFA Inhaler 1 Puff(s) Inhalation every 4 hours  amoxicillin  875 milliGRAM(s)/clavulanate 1 Tablet(s) Oral two times a day  lactobacillus acidophilus 1 Tablet(s) Oral two times a day with meals    LAbs all reviewed    < from: Transthoracic Echocardiogram Follow Up (09.25.17 @ 10:27) >   Endocardium is not well visualized, however, overall left ventricular   systolic function appears decreased. Technically Difficult Study.   Estimated Ejection Fraction is 35 - 40%.   Normal appearing right ventricle structure and function.   Significant fibrocalcific changes noted to the aortic valve leaflets with   decreased leaflet excursion. Peak transaortic gradient is 60mmHg and mean   transaortic gradient is 32 mmHg; calculated aortic valve area is 0.53 cm2   , consistent with severe aortic stenosis. Trace mild aortic regurgitation   is present.   Severe posterior mitral annular calcification noted. Fibrocalcific   changes   noted to the mitral valve leaflets with restriction in mitral leaflet   excursion. Mean transmitral gradient is 5mmHg; this is finding is   consistent with moderate mitral stenosis. Moderate mitral regurgitation   noted.

## 2017-09-26 NOTE — PROGRESS NOTE ADULT - PROBLEM SELECTOR PROBLEM 2
R/O Diastolic CHF, acute on chronic
R/O Diastolic CHF, acute on chronic
Troponin I above reference range

## 2017-09-26 NOTE — PROGRESS NOTE ADULT - PROBLEM SELECTOR PROBLEM 3
Dementia without behavioral disturbance, unspecified dementia type
Dementia without behavioral disturbance, unspecified dementia type
Anemia, unspecified type

## 2017-09-26 NOTE — PROGRESS NOTE ADULT - ASSESSMENT
89 year old female with a PMHx dementia, HLD, and anemia acute for acute Enteroviral infection with possible superimposed strep pneumonie infection and weakness:       # Community acquired pneumonia- improving  - 2/2 Enterovirus with possible superimposed bacterial pneumonia suspect strep pneumo given + strep throat test at [facility].   - complicated by Enterovirus.   - s/p Levaquin in ED --> switched to Augmentin day #3   Results of CT chest 9/24 noted : small left pl effusion and either atelectasis or a left basilar PNA.   - supplemental O2. PT for weakness.     # Troponin I above reference range.    - Likely demand mediated in the setting of PNA.   - continue with daily ASA 81mg  - appreciate cardio following.     # Chronic systolic and diastolic CHF   # Severe AS  - presently appears euvolemic  - No ACEI or BB as pt has borderline BP and would avoid hypotension in pt w severe AS      # Anemia, unspecified type  - stable, on iron tabs.   - add prn stool softeners.     # Dementia without behavioral disturbance, unspecified dementia type.   - Mood stable - periods of confusion noted.   - Resumed home meds remeron, namenda, and aricept  - supportive care/fall precautions.    # Hyperlipidemia, unspecified hyperlipidemia type.    - Low fat diet  - on statin.

## 2017-09-26 NOTE — PROGRESS NOTE ADULT - ASSESSMENT
9/24/17:  Pt with above history and old IWMI with increased resp distress and lethargy admitted with viral pneumonia and some degree of acute on chronic diastolic CHF.  Elevated troponin levels due to demand ischemia and no indication for invasive cardiac testing at this time.  Will repeat an echo and continue respiratory support.  Pt has a DNI/DNR previously and family request this again.  Continue preadmission meds with Lasix as BP tolerates.  Continue as outlined by medicine etal.  Will follow.    9/25/17:  URI and ? pneumonia being treated.  Clinically stable from a cardiac standpoint so far.  DNR/DRI in place.  Continue supportive care as per medicine etal.  No indication for further cardiac testing at this time.  Will follow.    9/26/17:  Clinical status without significant change.  Continue present management.

## 2017-09-27 ENCOUNTER — TRANSCRIPTION ENCOUNTER (OUTPATIENT)
Age: 82
End: 2017-09-27

## 2017-09-27 VITALS
HEART RATE: 63 BPM | OXYGEN SATURATION: 93 % | SYSTOLIC BLOOD PRESSURE: 119 MMHG | DIASTOLIC BLOOD PRESSURE: 53 MMHG | RESPIRATION RATE: 19 BRPM | TEMPERATURE: 99 F

## 2017-09-27 RX ORDER — MIRTAZAPINE 45 MG/1
1 TABLET, ORALLY DISINTEGRATING ORAL
Qty: 0 | Refills: 0 | COMMUNITY
Start: 2017-09-27

## 2017-09-27 RX ORDER — LACTOBACILLUS ACIDOPHILUS 100MM CELL
1 CAPSULE ORAL
Qty: 6 | Refills: 0 | OUTPATIENT
Start: 2017-09-27 | End: 2017-10-03

## 2017-09-27 RX ADMIN — HEPARIN SODIUM 5000 UNIT(S): 5000 INJECTION INTRAVENOUS; SUBCUTANEOUS at 05:08

## 2017-09-27 RX ADMIN — Medication 1 TABLET(S): at 08:02

## 2017-09-27 RX ADMIN — Medication 1 TABLET(S): at 05:08

## 2017-09-27 RX ADMIN — Medication 81 MILLIGRAM(S): at 11:36

## 2017-09-27 RX ADMIN — Medication 1000 UNIT(S): at 11:36

## 2017-09-27 RX ADMIN — MEMANTINE HYDROCHLORIDE 10 MILLIGRAM(S): 10 TABLET ORAL at 11:36

## 2017-09-27 NOTE — DISCHARGE NOTE ADULT - PLAN OF CARE
resolution complete course of antibiotics.    resume all other medications as prescribed prior to admission  follow up with PMD

## 2017-09-27 NOTE — DISCHARGE NOTE ADULT - HOSPITAL COURSE
CHIEF COMPLAINT:  fever, lethargy  SUBJECTIVE: cough improving, feels better today, more energy, wants to go home. No sob and no difficulty expectorating.  No CP, lightheadedness, dizziness.  REVIEW OF SYSTEMS:  negative unless indicated above    Vital Signs Last 24 Hrs  T(C): 37.4 (27 Sep 2017 10:59), Max: 37.4 (27 Sep 2017 10:59)  T(F): 99.3 (27 Sep 2017 10:59), Max: 99.3 (27 Sep 2017 10:59)  HR: 63 (27 Sep 2017 10:59) (61 - 72)  BP: 119/53 (27 Sep 2017 10:59) (106/56 - 135/29)  BP(mean): --  RR: 19 (27 Sep 2017 10:59) (17 - 19)  SpO2: 93% (27 Sep 2017 10:59) (93% - 98%)    PHYSICAL EXAM:  Constitutional: NAD, awake, alert, frail elderly female.   HEENT: PERR, EOMI, Normal Hearing, MMM  Neck: Soft and supple, No LAD, No JVD  Respiratory: bilateral mild rhonchi at bases.   Cardiovascular: S1 and S2, regular rate and rhythm, blowing holosystolic Murmur, gallops or rubs  Gastrointestinal: Bowel Sounds present, soft, nontender, nondistended, no guarding, no rebound  Extremities: No peripheral edema  Vascular: 2+ peripheral pulses  Neurological: A/O x 3, no focal deficits  Musculoskeletal: 5/5 strength b/l upper and lower extremities  Skin: No rashes    Assessment and plan  89 year old female with a PMHx dementia, HLD, and anemia acute for acute Enteroviral infection with possible superimposed strep pneumonie infection and weakness:       # Community acquired pneumonia- improving  - 2/2 Enterovirus with possible superimposed bacterial pneumonia suspect strep pneumo given + strep throat test at Urgent Care [facility].    - Augmentin - Abx day 4  Results of CT chest 9/24 noted : small left pl effusion and either atelectasis or a left basilar PNA.     # Troponin I above reference range.    - Likely demand mediated in the setting of PNA.   - continue with daily ASA 81mg  - appreciate cardio following.     # Chronic systolic and diastolic CHF   # Severe AS  - presently appears euvolemic  - No ACEI or BB as pt has borderline BP and would avoid hypotension in pt w severe AS      # Anemia, unspecified type  - stable, on iron tabs.   - add prn stool softeners.     # Dementia without behavioral disturbance  - Resumed home meds  - supportive care/fall precautions.    # Hyperlipidemia, unspecified hyperlipidemia type.    - Low fat diet  - on statin.     time spent on discharge 40 mintues

## 2017-09-27 NOTE — DISCHARGE NOTE ADULT - PATIENT PORTAL LINK FT
“You can access the [facility] Patient Portal, offered by [facility], by registering with the following website: [email or web address]”

## 2017-09-27 NOTE — DISCHARGE NOTE ADULT - MEDICATION SUMMARY - MEDICATIONS TO TAKE
I will START or STAY ON the medications listed below when I get home from the hospital:    Aspir 81 oral delayed release tablet  -- 1 tab(s) by mouth once a day  -- Indication: For Aspirin    Remeron 30 mg oral tablet  -- 1 tab(s) by mouth once a day (at bedtime)  -- Indication: For Dementia without behavioral disturbance, unspecified dementia type    Remeron  -- 0.5 tab(s) by mouth once a day  -- Indication: For Dementia without behavioral disturbance, unspecified dementia type    mirtazapine 30 mg oral tablet  -- 1 tab(s) by mouth once a day (at bedtime)  -- Indication: For Dementia without behavioral disturbance, unspecified dementia type    mirtazapine 7.5 mg oral tablet  -- 1 tab(s) by mouth once a day (at bedtime)  -- Indication: For Dementia without behavioral disturbance, unspecified dementia type    Lipitor 40 mg oral tablet  -- 1 tab(s) by mouth once a day (at bedtime)  -- Indication: For Cholesterol    Namzaric 21 mg-10 mg oral capsule, extended release  -- 1 cap(s) by mouth once a day  -- Indication: For Dementia without behavioral disturbance, unspecified dementia type    Lasix 40 mg oral tablet  -- 1 tab(s) by mouth 4 times a week  -- Indication: For volume    Iron-150 oral tablet  -- 1 tab(s) by mouth once a day  -- Indication: For Anemia, unspecified type    Benefiber 1 g oral tablet, chewable  -- 1 tab(s) by mouth once a day  -- Indication: For GI    Klor-Con 10 oral tablet, extended release  -- 1 tab(s) by mouth 4 times a day (after meals and at bedtime)  -- Indication: For while taking lasix for potassium replacement    amoxicillin-clavulanate 875 mg-125 mg oral tablet  -- 1 tab(s) by mouth 2 times a day  -- Indication: For PNEUMONIA    lactobacillus acidophilus oral capsule  -- 1 cap(s) by mouth once a day   -- Indication: For Probiotic    Vitamin D3 1000 intl units oral tablet  -- 1 tab(s) by mouth once a day  -- Indication: For vitamin

## 2017-09-27 NOTE — DISCHARGE NOTE ADULT - DISCHARGE TO
Home with Home Care [facility] [employer] 24[employer] Live in Aid Reinstated/Home with Home Care

## 2017-09-27 NOTE — DISCHARGE NOTE ADULT - NS AS DC FOLLOWUP STROKE INST
Influenza vaccination (VIS Pub Date: August 19, 2014) Influenza vaccination (VIS Pub Date: August 19, 2014)/Heart Failure

## 2017-09-27 NOTE — DISCHARGE NOTE ADULT - CARE PROVIDER_API CALL
Dwain Valera (MD), Cardiovascular Disease; Internal Medicine  [address] [place] [address]  Phone: [phone]  Fax: [phone]

## 2017-09-27 NOTE — DISCHARGE NOTE ADULT - NS AS DC HF EDUCATION INSTRUCTIONS
Report weight gain of 2 or more pounds in one day or 3 or more pounds in one week, worsening shortness of breath, fatigue, weakness, increased swelling of hands and feet to primary care provider/Activities as tolerated/Monitor Weight Daily/Low salt diet/Call Primary Care Provider for follow-up after discharge

## 2017-09-29 LAB
CULTURE RESULTS: SIGNIFICANT CHANGE UP
CULTURE RESULTS: SIGNIFICANT CHANGE UP
SPECIMEN SOURCE: SIGNIFICANT CHANGE UP
SPECIMEN SOURCE: SIGNIFICANT CHANGE UP

## 2017-09-30 DIAGNOSIS — I24.8 OTHER FORMS OF ACUTE ISCHEMIC HEART DISEASE: ICD-10-CM

## 2017-09-30 DIAGNOSIS — J02.0 STREPTOCOCCAL PHARYNGITIS: ICD-10-CM

## 2017-09-30 DIAGNOSIS — F03.90 UNSPECIFIED DEMENTIA, UNSPECIFIED SEVERITY, WITHOUT BEHAVIORAL DISTURBANCE, PSYCHOTIC DISTURBANCE, MOOD DISTURBANCE, AND ANXIETY: ICD-10-CM

## 2017-09-30 DIAGNOSIS — J15.4 PNEUMONIA DUE TO OTHER STREPTOCOCCI: ICD-10-CM

## 2017-09-30 DIAGNOSIS — Z88.0 ALLERGY STATUS TO PENICILLIN: ICD-10-CM

## 2017-09-30 DIAGNOSIS — Z66 DO NOT RESUSCITATE: ICD-10-CM

## 2017-09-30 DIAGNOSIS — E78.5 HYPERLIPIDEMIA, UNSPECIFIED: ICD-10-CM

## 2017-09-30 DIAGNOSIS — D64.9 ANEMIA, UNSPECIFIED: ICD-10-CM

## 2017-09-30 DIAGNOSIS — I50.43 ACUTE ON CHRONIC COMBINED SYSTOLIC (CONGESTIVE) AND DIASTOLIC (CONGESTIVE) HEART FAILURE: ICD-10-CM

## 2017-09-30 DIAGNOSIS — J18.9 PNEUMONIA, UNSPECIFIED ORGANISM: ICD-10-CM

## 2017-09-30 DIAGNOSIS — I35.0 NONRHEUMATIC AORTIC (VALVE) STENOSIS: ICD-10-CM

## 2017-09-30 DIAGNOSIS — B97.10 UNSPECIFIED ENTEROVIRUS AS THE CAUSE OF DISEASES CLASSIFIED ELSEWHERE: ICD-10-CM

## 2017-09-30 DIAGNOSIS — I25.10 ATHEROSCLEROTIC HEART DISEASE OF NATIVE CORONARY ARTERY WITHOUT ANGINA PECTORIS: ICD-10-CM

## 2017-10-02 DIAGNOSIS — I50.42 CHRONIC COMBINED SYSTOLIC (CONGESTIVE) AND DIASTOLIC (CONGESTIVE) HEART FAILURE: ICD-10-CM

## 2019-01-30 DIAGNOSIS — N39.0 URINARY TRACT INFECTION, SITE NOT SPECIFIED: ICD-10-CM

## 2019-01-30 RX ORDER — LEVOFLOXACIN 500 MG/1
500 TABLET, FILM COATED ORAL DAILY
Qty: 7 | Refills: 0 | Status: ACTIVE | COMMUNITY
Start: 2019-01-30 | End: 1900-01-01

## 2020-12-21 PROBLEM — N39.0 ACUTE UTI: Status: RESOLVED | Noted: 2019-01-30 | Resolved: 2020-12-21

## 2021-04-12 NOTE — PROGRESS NOTE ADULT - PROVIDER SPECIALTY LIST ADULT
Problem: Communication  Goal: The ability to communicate needs accurately and effectively will improve  Outcome: PROGRESSING SLOWER THAN EXPECTED   Pt does not notify staff when he is in pain or when he needs something.  Pt educated on the use of the call light.  Pt encouraged to express needs to staff.     Problem: Mobility  Goal: Risk for activity intolerance will decrease  Outcome: PROGRESSING SLOWER THAN EXPECTED   Pt at this time does not wish to ambulate d/t pain of lower extremities.    Cardiology